# Patient Record
Sex: MALE | Race: WHITE
[De-identification: names, ages, dates, MRNs, and addresses within clinical notes are randomized per-mention and may not be internally consistent; named-entity substitution may affect disease eponyms.]

---

## 2019-09-22 ENCOUNTER — HOSPITAL ENCOUNTER (EMERGENCY)
Dept: HOSPITAL 96 - M.ERS | Age: 64
Discharge: HOME | End: 2019-09-22
Payer: MEDICARE

## 2019-09-22 VITALS — SYSTOLIC BLOOD PRESSURE: 116 MMHG | DIASTOLIC BLOOD PRESSURE: 80 MMHG

## 2019-09-22 VITALS — WEIGHT: 135.01 LBS | HEIGHT: 73 IN | BODY MASS INDEX: 17.89 KG/M2

## 2019-09-22 DIAGNOSIS — J44.9: ICD-10-CM

## 2019-09-22 DIAGNOSIS — M19.90: ICD-10-CM

## 2019-09-22 DIAGNOSIS — F17.210: ICD-10-CM

## 2019-09-22 DIAGNOSIS — J20.9: Primary | ICD-10-CM

## 2019-09-22 DIAGNOSIS — I10: ICD-10-CM

## 2019-09-22 LAB
ABSOLUTE BASOPHILS: 0.1 THOU/UL (ref 0–0.2)
ABSOLUTE EOSINOPHILS: 0.3 THOU/UL (ref 0–0.7)
ABSOLUTE MONOCYTES: 0.6 THOU/UL (ref 0–1.2)
ALBUMIN SERPL-MCNC: 3.8 G/DL (ref 3.4–5)
ALP SERPL-CCNC: 80 U/L (ref 46–116)
ALT SERPL-CCNC: 32 U/L (ref 30–65)
ANION GAP SERPL CALC-SCNC: 9 MMOL/L (ref 7–16)
AST SERPL-CCNC: 24 U/L (ref 15–37)
BASOPHILS NFR BLD AUTO: 1.8 %
BILIRUB SERPL-MCNC: 0.5 MG/DL
BUN SERPL-MCNC: 15 MG/DL (ref 7–18)
CALCIUM SERPL-MCNC: 8.9 MG/DL (ref 8.5–10.1)
CHLORIDE SERPL-SCNC: 97 MMOL/L (ref 98–107)
CO2 SERPL-SCNC: 30 MMOL/L (ref 21–32)
CREAT SERPL-MCNC: 1.1 MG/DL (ref 0.6–1.3)
EOSINOPHIL NFR BLD: 3.8 %
GLUCOSE SERPL-MCNC: 96 MG/DL (ref 70–99)
GRANULOCYTES NFR BLD MANUAL: 54.5 %
HCT VFR BLD CALC: 40.3 % (ref 42–52)
HGB BLD-MCNC: 13.8 GM/DL (ref 14–18)
LYMPHOCYTES # BLD: 2.4 THOU/UL (ref 0.8–5.3)
LYMPHOCYTES NFR BLD AUTO: 31.7 %
MCH RBC QN AUTO: 32.5 PG (ref 26–34)
MCHC RBC AUTO-ENTMCNC: 34.3 G/DL (ref 28–37)
MCV RBC: 94.5 FL (ref 80–100)
MONOCYTES NFR BLD: 8.2 %
MPV: 7.4 FL. (ref 7.2–11.1)
NEUTROPHILS # BLD: 4.1 THOU/UL (ref 1.6–8.1)
NUCLEATED RBCS: 0 /100WBC
PLATELET COUNT*: 445 THOU/UL (ref 150–400)
POTASSIUM SERPL-SCNC: 3.4 MMOL/L (ref 3.5–5.1)
PROT SERPL-MCNC: 7.3 G/DL (ref 6.4–8.2)
RBC # BLD AUTO: 4.26 MIL/UL (ref 4.5–6)
RDW-CV: 14 % (ref 10.5–14.5)
SODIUM SERPL-SCNC: 136 MMOL/L (ref 136–145)
TROPONIN-I LEVEL: <0.06 NG/ML (ref ?–0.06)
WBC # BLD AUTO: 7.6 THOU/UL (ref 4–11)

## 2019-09-22 NOTE — E[place] [address]
Phone:  [phone]                     ELECTROCARDIOGRAM REPORT      
_______________________________________________________________________________
 
Name:       JEANCARLOS DIEHL            Room:                      [facility]#:  [id number]      Account #:      [id number]  
Admission:  19     Attend Phys:                         
Discharge:  19     Date of Birth:  [birth year]  
         Report #: [id number]
    [id number]
_______________________________________________________________________________
THIS REPORT FOR:  //name//                      
 
                         [facility] ED
                                       
Test Date:    2019               Test Time:    10:31:04
Pat Name:     JEANCARLOS DIEHL             Department:   
Patient ID:   [id number]            Room:          
Gender:       M                        Technician:   
:          1955               Requested By: Sterling Ortega
Order Number: [id number] MD:   Ramos Powers
                                 Measurements
Intervals                              Axis          
Rate:         73                       P:            77
AK:           150                      QRS:          80
QRSD:         96                       T:            50
QT:           445                                    
QTc:          491                                    
                           Interpretive Statements
Sinus rhythm with pac's
Probable left atrial enlargement
Borderline prolonged QT interval
Compared to ECG 2016 14:09:12
No significant changes
 
Electronically Signed On 2019 12:25:51 CDT by Raoms Powers
[email or web address]
 
 
 
 
 
 
 
 
 
 
 
 
 
 
 
 
 
  <ELECTRONICALLY SIGNED>
                                           By: Ramos Powers MD, [facility]      
  19     1225
D: 19 1031   _____________________________________
T: 19 1031   Ramos Powers MD, FAC        /EPI